# Patient Record
Sex: FEMALE | Race: WHITE | NOT HISPANIC OR LATINO | URBAN - METROPOLITAN AREA
[De-identification: names, ages, dates, MRNs, and addresses within clinical notes are randomized per-mention and may not be internally consistent; named-entity substitution may affect disease eponyms.]

---

## 2023-09-20 ENCOUNTER — OFFICE (OUTPATIENT)
Dept: URBAN - METROPOLITAN AREA CLINIC 19 | Facility: CLINIC | Age: 48
End: 2023-09-20

## 2023-09-20 VITALS
HEART RATE: 62 BPM | SYSTOLIC BLOOD PRESSURE: 99 MMHG | HEIGHT: 65 IN | DIASTOLIC BLOOD PRESSURE: 62 MMHG | WEIGHT: 127 LBS | OXYGEN SATURATION: 97 %

## 2023-09-20 DIAGNOSIS — K92.1 MELENA: ICD-10-CM

## 2023-09-20 DIAGNOSIS — K59.09 OTHER CONSTIPATION: ICD-10-CM

## 2023-09-20 PROCEDURE — 99204 OFFICE O/P NEW MOD 45 MIN: CPT

## 2023-09-20 RX ORDER — POLYETHYLENE GLYCOL 3350, SODIUM SULFATE, SODIUM CHLORIDE, POTASSIUM CHLORIDE, ASCORBIC ACID, SODIUM ASCORBATE 140-9-5.2G
KIT ORAL
Qty: 1 | Refills: 0 | Status: ACTIVE
Start: 2023-09-20

## 2023-09-20 NOTE — SERVICEHPINOTES
48-year-old female here with complaints of rectal bleeding.  She has had 3 episodes of bright red blood on toilet paper in one week.  Bowel movements are regular with a sense of incomplete evacuation.  Denies hard stool or straining.  Denies adequate water or dietary fiber intake.  History of C-sections x1 and two D&ampC's.  History of liver resection in the early 2000s for an adenoma.   History of MTHFR and on aspirin daily. Denies any upper GI symptoms.  Denies abdominal pain.  Denies abnormal weight loss.  No family history of colon cancer, colon polyps, IBD.  No cardiac issues, not taking any blood thinners or diet pills.

## 2023-09-20 NOTE — SERVICENOTES
Procedure and risks including but not limited to anesthesia, bleeding perforation, etc. were discussed with the patient in detail., 

MD Addendum: IVenessa MD, independently interviewed and examined this patient and made modifications to the final HPI, exam, impression, and plan as initially scribed by Harriett Dillard NP.

Haritha was the chaperone